# Patient Record
Sex: FEMALE
[De-identification: names, ages, dates, MRNs, and addresses within clinical notes are randomized per-mention and may not be internally consistent; named-entity substitution may affect disease eponyms.]

---

## 2022-10-19 ENCOUNTER — DASHBOARD ENCOUNTERS (OUTPATIENT)
Age: 37
End: 2022-10-19

## 2022-10-19 ENCOUNTER — OFFICE VISIT (OUTPATIENT)
Dept: URBAN - METROPOLITAN AREA CLINIC 7 | Facility: CLINIC | Age: 37
End: 2022-10-19
Payer: COMMERCIAL

## 2022-10-19 VITALS
HEIGHT: 64 IN | BODY MASS INDEX: 25.44 KG/M2 | DIASTOLIC BLOOD PRESSURE: 70 MMHG | WEIGHT: 149 LBS | RESPIRATION RATE: 16 BRPM | SYSTOLIC BLOOD PRESSURE: 120 MMHG | TEMPERATURE: 97.8 F

## 2022-10-19 DIAGNOSIS — R10.32 LLQ PAIN: ICD-10-CM

## 2022-10-19 PROCEDURE — 99204 OFFICE O/P NEW MOD 45 MIN: CPT | Performed by: INTERNAL MEDICINE

## 2022-10-19 NOTE — HPI-TODAY'S VISIT:
Patient is new to the practice. She is having some bloating, swelling in the pelvis, discomfort in the anus, discomfort when she sits. Some pulsations in the LLQ pain. Intermittent bleeding. Regular BM's, not super constipated. Mother has similar issues. Maybe cramping. Has been going on for some time. Time helps her pain. No diarrheal symptoms in large part. Eating well. Sometimes with defecation, she has nausea, some pain, and then when she finishes she is better. No anal pain per se.

## 2022-10-26 ENCOUNTER — OFFICE VISIT (OUTPATIENT)
Dept: URBAN - METROPOLITAN AREA SURGERY CENTER 5 | Facility: SURGERY CENTER | Age: 37
End: 2022-10-26
Payer: COMMERCIAL

## 2022-10-26 DIAGNOSIS — R10.32 ABDOMINAL CRAMPING IN LEFT LOWER QUADRANT: ICD-10-CM

## 2022-10-26 DIAGNOSIS — K64.4 ANAL SKIN TAG: ICD-10-CM

## 2022-10-26 DIAGNOSIS — K64.8 BLEEDING INTERNAL HEMORRHOIDS: ICD-10-CM

## 2022-10-26 PROCEDURE — 45380 COLONOSCOPY AND BIOPSY: CPT | Performed by: INTERNAL MEDICINE

## 2022-10-31 ENCOUNTER — TELEPHONE ENCOUNTER (OUTPATIENT)
Dept: URBAN - METROPOLITAN AREA CLINIC 7 | Facility: CLINIC | Age: 37
End: 2022-10-31

## 2022-11-08 ENCOUNTER — TELEPHONE ENCOUNTER (OUTPATIENT)
Dept: URBAN - METROPOLITAN AREA CLINIC 7 | Facility: CLINIC | Age: 37
End: 2022-11-08

## 2022-11-08 ENCOUNTER — LAB OUTSIDE AN ENCOUNTER (OUTPATIENT)
Dept: URBAN - METROPOLITAN AREA CLINIC 7 | Facility: CLINIC | Age: 37
End: 2022-11-08

## 2022-11-08 PROBLEM — 301716002: Status: ACTIVE | Noted: 2022-10-19

## 2023-09-06 ENCOUNTER — TELEPHONE ENCOUNTER (OUTPATIENT)
Dept: URBAN - METROPOLITAN AREA CLINIC 7 | Facility: CLINIC | Age: 38
End: 2023-09-06